# Patient Record
Sex: FEMALE | Race: WHITE | NOT HISPANIC OR LATINO
[De-identification: names, ages, dates, MRNs, and addresses within clinical notes are randomized per-mention and may not be internally consistent; named-entity substitution may affect disease eponyms.]

---

## 2017-01-09 ENCOUNTER — APPOINTMENT (OUTPATIENT)
Dept: NEUROLOGY | Facility: CLINIC | Age: 57
End: 2017-01-09

## 2017-01-17 ENCOUNTER — OUTPATIENT (OUTPATIENT)
Dept: OUTPATIENT SERVICES | Facility: HOSPITAL | Age: 57
LOS: 1 days | End: 2017-01-17
Payer: COMMERCIAL

## 2017-01-17 PROCEDURE — 72148 MRI LUMBAR SPINE W/O DYE: CPT | Mod: 26

## 2017-01-17 PROCEDURE — 70553 MRI BRAIN STEM W/O & W/DYE: CPT | Mod: 26

## 2017-01-17 PROCEDURE — 70553 MRI BRAIN STEM W/O & W/DYE: CPT

## 2017-01-17 PROCEDURE — A9585: CPT

## 2017-01-17 PROCEDURE — 72148 MRI LUMBAR SPINE W/O DYE: CPT

## 2017-01-18 ENCOUNTER — RESULT REVIEW (OUTPATIENT)
Age: 57
End: 2017-01-18

## 2017-02-01 ENCOUNTER — TRANSCRIPTION ENCOUNTER (OUTPATIENT)
Age: 57
End: 2017-02-01

## 2017-02-13 ENCOUNTER — TRANSCRIPTION ENCOUNTER (OUTPATIENT)
Age: 57
End: 2017-02-13

## 2017-02-13 ENCOUNTER — APPOINTMENT (OUTPATIENT)
Dept: NEUROSURGERY | Facility: CLINIC | Age: 57
End: 2017-02-13

## 2017-02-13 VITALS
BODY MASS INDEX: 25.4 KG/M2 | HEIGHT: 62 IN | HEART RATE: 108 BPM | OXYGEN SATURATION: 97 % | WEIGHT: 138 LBS | SYSTOLIC BLOOD PRESSURE: 122 MMHG | DIASTOLIC BLOOD PRESSURE: 83 MMHG

## 2017-02-13 DIAGNOSIS — H91.92 UNSPECIFIED HEARING LOSS, LEFT EAR: ICD-10-CM

## 2017-02-13 RX ORDER — PANTOPRAZOLE SODIUM 40 MG/1
40 GRANULE, DELAYED RELEASE ORAL
Refills: 0 | Status: ACTIVE | COMMUNITY

## 2017-05-16 ENCOUNTER — APPOINTMENT (OUTPATIENT)
Dept: HEART AND VASCULAR | Facility: CLINIC | Age: 57
End: 2017-05-16

## 2017-05-16 ENCOUNTER — LABORATORY RESULT (OUTPATIENT)
Age: 57
End: 2017-05-16

## 2017-05-16 VITALS
SYSTOLIC BLOOD PRESSURE: 110 MMHG | WEIGHT: 132 LBS | DIASTOLIC BLOOD PRESSURE: 80 MMHG | HEART RATE: 65 BPM | HEIGHT: 62 IN | BODY MASS INDEX: 24.29 KG/M2

## 2017-05-16 VITALS — DIASTOLIC BLOOD PRESSURE: 84 MMHG | SYSTOLIC BLOOD PRESSURE: 126 MMHG | HEART RATE: 63 BPM

## 2017-05-16 DIAGNOSIS — I45.10 UNSPECIFIED RIGHT BUNDLE-BRANCH BLOCK: ICD-10-CM

## 2017-05-16 DIAGNOSIS — R00.2 PALPITATIONS: ICD-10-CM

## 2017-05-16 DIAGNOSIS — M54.5 LOW BACK PAIN: ICD-10-CM

## 2017-05-16 DIAGNOSIS — R42 DIZZINESS AND GIDDINESS: ICD-10-CM

## 2017-05-18 LAB
25(OH)D3 SERPL-MCNC: 28.3 NG/ML
ALBUMIN SERPL ELPH-MCNC: 4.2 G/DL
ALP BLD-CCNC: 91 U/L
ALT SERPL-CCNC: 17 U/L
ANION GAP SERPL CALC-SCNC: 14 MMOL/L
AST SERPL-CCNC: 19 U/L
BASOPHILS # BLD AUTO: 0.04 K/UL
BASOPHILS NFR BLD AUTO: 0.7 %
BILIRUB SERPL-MCNC: 0.4 MG/DL
BUN SERPL-MCNC: 15 MG/DL
CALCIUM SERPL-MCNC: 9.5 MG/DL
CHLORIDE SERPL-SCNC: 104 MMOL/L
CHOLEST SERPL-MCNC: 197 MG/DL
CHOLEST/HDLC SERPL: 2.9 RATIO
CO2 SERPL-SCNC: 21 MMOL/L
CREAT SERPL-MCNC: 0.67 MG/DL
EOSINOPHIL # BLD AUTO: 0.09 K/UL
EOSINOPHIL NFR BLD AUTO: 1.5 %
ESTRADIOL SERPL-MCNC: <5 PG/ML
FSH SERPL-MCNC: 75.1 IU/L
GLUCOSE SERPL-MCNC: 91 MG/DL
HBA1C MFR BLD HPLC: 5 %
HCT VFR BLD CALC: 39.2 %
HDLC SERPL-MCNC: 68 MG/DL
HGB BLD-MCNC: 12.8 G/DL
IMM GRANULOCYTES NFR BLD AUTO: 0.2 %
LDLC SERPL CALC-MCNC: 108 MG/DL
LH SERPL-ACNC: 29.7 IU/L
LYMPHOCYTES # BLD AUTO: 1.1 K/UL
LYMPHOCYTES NFR BLD AUTO: 18 %
MAGNESIUM RBC-MCNC: 5.5 MG/DL
MAN DIFF?: NORMAL
MCHC RBC-ENTMCNC: 30 PG
MCHC RBC-ENTMCNC: 32.7 GM/DL
MCV RBC AUTO: 91.8 FL
MONOCYTES # BLD AUTO: 0.42 K/UL
MONOCYTES NFR BLD AUTO: 6.9 %
NEUTROPHILS # BLD AUTO: 4.46 K/UL
NEUTROPHILS NFR BLD AUTO: 72.7 %
PLATELET # BLD AUTO: 186 K/UL
POTASSIUM SERPL-SCNC: 4.1 MMOL/L
PROT SERPL-MCNC: 6.8 G/DL
RBC # BLD: 4.27 M/UL
RBC # FLD: 14.1 %
SODIUM SERPL-SCNC: 139 MMOL/L
T3RU NFR SERPL: 0.9 INDEX
T4 SERPL-MCNC: 5.8 UG/DL
TRIGL SERPL-MCNC: 104 MG/DL
TSH SERPL-ACNC: 0.82 UIU/ML
WBC # FLD AUTO: 6.12 K/UL

## 2017-06-01 ENCOUNTER — APPOINTMENT (OUTPATIENT)
Dept: HEART AND VASCULAR | Facility: CLINIC | Age: 57
End: 2017-06-01

## 2017-06-01 VITALS
HEART RATE: 60 BPM | SYSTOLIC BLOOD PRESSURE: 120 MMHG | BODY MASS INDEX: 24.11 KG/M2 | DIASTOLIC BLOOD PRESSURE: 82 MMHG | WEIGHT: 131 LBS | HEIGHT: 62 IN

## 2017-06-01 DIAGNOSIS — R23.3 SPONTANEOUS ECCHYMOSES: ICD-10-CM

## 2017-06-01 DIAGNOSIS — I83.10 VARICOSE VEINS OF UNSPECIFIED LOWER EXTREMITY WITH INFLAMMATION: ICD-10-CM

## 2017-06-01 DIAGNOSIS — M79.605 PAIN IN RIGHT LEG: ICD-10-CM

## 2017-06-01 DIAGNOSIS — M79.604 PAIN IN RIGHT LEG: ICD-10-CM

## 2017-06-01 DIAGNOSIS — M48.06 SPINAL STENOSIS, LUMBAR REGION: ICD-10-CM

## 2017-06-01 DIAGNOSIS — R60.0 LOCALIZED EDEMA: ICD-10-CM

## 2017-06-01 DIAGNOSIS — I83.893 VARICOSE VEINS OF BILATERAL LOWER EXTREMITIES WITH OTHER COMPLICATIONS: ICD-10-CM

## 2017-06-06 ENCOUNTER — FORM ENCOUNTER (OUTPATIENT)
Age: 57
End: 2017-06-06

## 2017-06-07 ENCOUNTER — OUTPATIENT (OUTPATIENT)
Dept: OUTPATIENT SERVICES | Facility: HOSPITAL | Age: 57
LOS: 1 days | End: 2017-06-07
Payer: COMMERCIAL

## 2017-06-07 ENCOUNTER — APPOINTMENT (OUTPATIENT)
Dept: ORTHOPEDIC SURGERY | Facility: CLINIC | Age: 57
End: 2017-06-07

## 2017-06-07 VITALS
BODY MASS INDEX: 24.29 KG/M2 | DIASTOLIC BLOOD PRESSURE: 80 MMHG | WEIGHT: 132 LBS | SYSTOLIC BLOOD PRESSURE: 122 MMHG | HEIGHT: 62 IN

## 2017-06-07 DIAGNOSIS — M48.06 SPINAL STENOSIS, LUMBAR REGION: ICD-10-CM

## 2017-06-07 DIAGNOSIS — M54.16 SPINAL STENOSIS, LUMBAR REGION: ICD-10-CM

## 2017-06-07 PROCEDURE — 72100 X-RAY EXAM L-S SPINE 2/3 VWS: CPT

## 2017-06-07 PROCEDURE — 72082 X-RAY EXAM ENTIRE SPI 2/3 VW: CPT

## 2017-06-07 PROCEDURE — 72082 X-RAY EXAM ENTIRE SPI 2/3 VW: CPT | Mod: 26

## 2017-08-05 PROBLEM — M48.06 LUMBAR SPINAL STENOSIS: Status: ACTIVE | Noted: 2017-06-07

## 2017-08-05 PROBLEM — M79.604 LEG PAIN, BILATERAL: Status: ACTIVE | Noted: 2017-08-05

## 2017-08-05 PROBLEM — I83.10 STASIS ECZEMA: Status: ACTIVE | Noted: 2017-08-05

## 2017-08-05 PROBLEM — R60.0 BILATERAL LEG EDEMA: Status: ACTIVE | Noted: 2017-08-05

## 2017-08-05 PROBLEM — I83.893 SYMPTOMATIC VARICOSE VEINS, BILATERAL: Status: ACTIVE | Noted: 2017-08-05

## 2017-08-22 ENCOUNTER — APPOINTMENT (OUTPATIENT)
Dept: ORTHOPEDIC SURGERY | Facility: CLINIC | Age: 57
End: 2017-08-22

## 2017-10-17 ENCOUNTER — APPOINTMENT (OUTPATIENT)
Dept: HEART AND VASCULAR | Facility: CLINIC | Age: 57
End: 2017-10-17

## 2017-12-07 ENCOUNTER — APPOINTMENT (OUTPATIENT)
Dept: HEART AND VASCULAR | Facility: CLINIC | Age: 57
End: 2017-12-07

## 2018-11-18 ENCOUNTER — FORM ENCOUNTER (OUTPATIENT)
Age: 58
End: 2018-11-18

## 2018-11-19 ENCOUNTER — APPOINTMENT (OUTPATIENT)
Dept: MRI IMAGING | Facility: HOSPITAL | Age: 58
End: 2018-11-19

## 2018-11-19 ENCOUNTER — OUTPATIENT (OUTPATIENT)
Dept: OUTPATIENT SERVICES | Facility: HOSPITAL | Age: 58
LOS: 1 days | End: 2018-11-19
Payer: COMMERCIAL

## 2018-11-19 PROCEDURE — 70553 MRI BRAIN STEM W/O & W/DYE: CPT | Mod: 26

## 2018-11-19 PROCEDURE — 70553 MRI BRAIN STEM W/O & W/DYE: CPT

## 2018-11-19 PROCEDURE — A9585: CPT

## 2019-09-18 ENCOUNTER — APPOINTMENT (OUTPATIENT)
Dept: INTERNAL MEDICINE | Facility: CLINIC | Age: 59
End: 2019-09-18
Payer: COMMERCIAL

## 2019-09-18 VITALS
BODY MASS INDEX: 28.68 KG/M2 | TEMPERATURE: 98.5 F | HEART RATE: 91 BPM | SYSTOLIC BLOOD PRESSURE: 136 MMHG | DIASTOLIC BLOOD PRESSURE: 86 MMHG | WEIGHT: 146.1 LBS | OXYGEN SATURATION: 99 % | HEIGHT: 60 IN

## 2019-09-18 PROCEDURE — 94010 BREATHING CAPACITY TEST: CPT

## 2019-09-18 PROCEDURE — 99204 OFFICE O/P NEW MOD 45 MIN: CPT | Mod: 25

## 2019-09-18 PROCEDURE — 36415 COLL VENOUS BLD VENIPUNCTURE: CPT

## 2019-09-20 LAB
A1AT SERPL-MCNC: 125 MG/DL
DEPRECATED KAPPA LC FREE/LAMBDA SER: 0.99 RATIO
IGA SER QL IEP: 156 MG/DL
IGG SER QL IEP: 1170 MG/DL
IGM SER QL IEP: 127 MG/DL
KAPPA LC CSF-MCNC: 1.67 MG/DL
KAPPA LC SERPL-MCNC: 1.65 MG/DL
RAPID RVP RESULT: DETECTED
RV+EV RNA SPEC QL NAA+PROBE: DETECTED

## 2019-09-20 NOTE — ASSESSMENT
[FreeTextEntry1] : Maryellen presents with chest tightness, BRIDGES, wheezing, and cough with green sputum.\par Suspect recent acute viral URI with cough - now more of an asthmatic bronchitis.\par Likely developed after recent air travel.\par Doubt underlying pulmonary disease but will exclude.\par \par Will review CXR CD with radiology\par May need CT chest\par Collect sputum for full cultures\par RVP sent\par Quantiferon Plus, alpha -1- antitrypsin level and immunoglobulin panel sent\par FVL done = see scanned report = d/w patient = WNL\par Will need full PFT's\par Advised flu and pneumococcal vaccination\par No longer smokes\par Weight control\par Cardiology f/u\par Rest\par Fluids\par Tylenol for fever, pain\par Mucinex DM bid\par Course of Bactrim DS bid\par Gargles/lozenges\par Steam\par Warm compresses to sinuses\par Saline nasal rinses\par Flonase daily\par Start Breo Ellipta 1 puff daily\par Albuterol if needed\par Add Prednisone 20 mg daily for 5-7 days\par RTC 1 week and as needed\par To call for any pulmonary issues\par To call if worse or if not improving\par \par

## 2019-09-20 NOTE — PHYSICAL EXAM
[General Appearance - Well Developed] : well developed [Well Groomed] : well groomed [General Appearance - Well Nourished] : well nourished [General Appearance - In No Acute Distress] : no acute distress [Jugular Venous Distention Increased] : there was no jugular-venous distention [Heart Rate And Rhythm] : heart rate and rhythm were normal [Edema] : no peripheral edema present [Heart Sounds] : normal S1 and S2 [] : no respiratory distress [Exaggerated Use Of Accessory Muscles For Inspiration] : no accessory muscle use [Respiration, Rhythm And Depth] : normal respiratory rhythm and effort [Abdomen Soft] : soft [Bowel Sounds] : normal bowel sounds [Abnormal Walk] : normal gait [Nail Clubbing] : no clubbing of the fingernails [Cyanosis, Localized] : no localized cyanosis [Skin Color & Pigmentation] : normal skin color and pigmentation [No Focal Deficits] : no focal deficits [Oriented To Time, Place, And Person] : oriented to person, place, and time [Affect] : the affect was normal [FreeTextEntry1] : R=16; scattered b/l expiratory wheezes; good air entry

## 2019-09-20 NOTE — HISTORY OF PRESENT ILLNESS
[Regional Soft Tissue Swelling Both Lower Extremities] : denies lower extremity edema [Fever] : denies fever [Wt Gain ___ Lbs] : recent [unfilled] ~Upound(s) weight gain [Difficulty Breathing During Exertion] : dyspnea on exertion [Feelings Of Weakness On Exertion] : exercise intolerance [Cough] : coughing [Wheezing] : wheezing [Chest Pain Or Discomfort] : chest pain [2  -  Slight] : 2, slight [Class II - Mild Symptoms and Slight Limitations] : II [Does not check] : The patient is not checking peak flow at home [More Frequent Use Needed Recently] : Patient reports recent increase in frequency of [URI] : upper respiratory tract infection [Exercise] : exercise [Side Effects] : the patient complains of medication side effects [Short Acting Beta Agonist] : short acting beta agonist agent [PFTs] : pulmonary function tests [Flu Vaccine] : influenza virus vaccine [Worsened] : have worsened [Wt Loss ___ Lbs] : no recent weight loss [Adherent] : the patient is not adherent with ~his/her~ medication regimen [Oxygen] : the patient uses no supplemental oxygen [Goals--Doing Well] : the patient is not doing well with ~his/her~ goals [de-identified] : see narrative [de-identified] : CXR 2019 [de-identified] : denies hemoptysis [FreeTextEntry1] : Maryellen comes in for an initial pulmonary evaluation because of cough, chest tightness, dyspnea on exertion, and wheezing. She is concerned because she feels that this is her second bout of bronchitis this year and wants to get to the bottom of it.\par She reports a history of walking pneumonia in the past. She denies any history of pleurisy. She has had one to 2 episodes of bronchitis in her lifetime but now reports 2 episodes in 2019. She denies any history of tuberculosis, asthma, COPD, pneumothorax, lung cancer, sleep apnea, or DVT/pulmonary embolism. She denies any history of prior lung surgery. There is a family history of COPD in her father and mother. 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 She denies any radiation or chemical exposure. She denies any asbestos exposure. She is having construction done in her home and reports exposure to dusts. She denies any drug use. She drinks alcohol socially. She reports that she experimented with cigarettes at age 12. She smoked anywhere from 2-20 cigarettes per day for 10-12 years in her 20s. She has 2 dogs in her home. She has had travel to [place], [place], [facility] and [place] over the year. She is allergic to penicillin. Her recent medication list was reviewed. The patient reports an episode of bronchitis treated with doxycycline in April 2019. She reports that she was vaccinated with MMR and TDAP on August 31, 2019. On September 6 she traveled to [place] via plane and returned on September 11. On September 12 she traveled to [place] and back. She had a recent clear chest x-ray. She has not had a chest CT or PFTs. She does not take the influenza vaccine and has not had pneumococcal vaccination. She is under the care of a cardiologist since 2015 because of an irregular heartbeat. She has had EKG, echo and Holter testing. Lately she has had weight gain with elevation in her blood pressure and cholesterol. She presently denies any edema, calf pain, orthopnea, or PND. She has had weight gain. She feels feverish and has sweats. Her MAXIMUM TEMPERATURE has been 99°F. She complains of chest. She has intermittent palpitations. She complains of a cough productive of green sputum without hemoptysis. She notices breathlessness on exertion and wheezing. She is fatigued. She denies any body aches. She denies any abdominal pain or nausea/vomiting/diarrhea.\par She recently has been evaluated in the emergency room and in urgent care. She has been treated with albuterol MDI and doxycycline. Her cardiologist has been concerned that her palpitations might be related to albuterol use. She presently has a Holter monitor in place. Her recent labs and EKG were reviewed and scanned into the system.

## 2019-09-20 NOTE — REVIEW OF SYSTEMS
[Recent Wt Gain (___ Lbs)] : recent [unfilled] ~Ulb weight gain [Fever] : fever [Postnasal Drip] : postnasal drip [Nasal Congestion] : nasal congestion [Sinus Problems] : sinus problems [As Noted in HPI] : as noted in HPI [Cough] : cough [Sputum] : sputum  [Dyspnea] : dyspnea [Chest Tightness] : chest tightness [Wheezing] : wheezing [Frequent URIs] : frequent upper respiratory infections [Hypertension] : ~T hypertension [Palpitations] : palpitations [Menopause] : menopause [Negative] : Sleep Disorder

## 2019-09-21 LAB — BACTERIA SPT CULT: NORMAL

## 2019-09-23 LAB
BACTERIA SPT CULT: NORMAL
M TB IFN-G BLD-IMP: NEGATIVE
QUANTIFERON TB PLUS MITOGEN MINUS NIL: >10 IU/ML
QUANTIFERON TB PLUS NIL: 0.03 IU/ML
QUANTIFERON TB PLUS TB1 MINUS NIL: 0.01 IU/ML
QUANTIFERON TB PLUS TB2 MINUS NIL: 0 IU/ML

## 2019-09-24 ENCOUNTER — APPOINTMENT (OUTPATIENT)
Dept: INTERNAL MEDICINE | Facility: CLINIC | Age: 59
End: 2019-09-24
Payer: COMMERCIAL

## 2019-09-24 VITALS
BODY MASS INDEX: 27.92 KG/M2 | HEIGHT: 60.5 IN | TEMPERATURE: 98.4 F | OXYGEN SATURATION: 98 % | SYSTOLIC BLOOD PRESSURE: 110 MMHG | DIASTOLIC BLOOD PRESSURE: 78 MMHG | HEART RATE: 75 BPM | WEIGHT: 146 LBS

## 2019-09-24 PROCEDURE — 99214 OFFICE O/P EST MOD 30 MIN: CPT | Mod: 25

## 2019-09-24 PROCEDURE — 94729 DIFFUSING CAPACITY: CPT

## 2019-09-24 PROCEDURE — 94060 EVALUATION OF WHEEZING: CPT

## 2019-09-24 PROCEDURE — 94726 PLETHYSMOGRAPHY LUNG VOLUMES: CPT

## 2019-09-26 NOTE — REVIEW OF SYSTEMS
[Recent Wt Gain (___ Lbs)] : recent [unfilled] ~Ulb weight gain [Nasal Congestion] : nasal congestion [Postnasal Drip] : postnasal drip [Sinus Problems] : sinus problems [As Noted in HPI] : as noted in HPI [Sputum] : sputum  [Dyspnea] : dyspnea [Cough] : cough [Chest Tightness] : chest tightness [Frequent URIs] : frequent upper respiratory infections [Hypertension] : ~T hypertension [Wheezing] : wheezing [Menopause] : menopause [Palpitations] : palpitations [Negative] : Sleep Disorder

## 2019-09-26 NOTE — PHYSICAL EXAM
[Well Groomed] : well groomed [General Appearance - Well Developed] : well developed [General Appearance - In No Acute Distress] : no acute distress [General Appearance - Well Nourished] : well nourished [Jugular Venous Distention Increased] : there was no jugular-venous distention [Heart Rate And Rhythm] : heart rate and rhythm were normal [Heart Sounds] : normal S1 and S2 [Edema] : no peripheral edema present [] : no respiratory distress [Respiration, Rhythm And Depth] : normal respiratory rhythm and effort [Exaggerated Use Of Accessory Muscles For Inspiration] : no accessory muscle use [Auscultation Breath Sounds / Voice Sounds] : lungs were clear to auscultation bilaterally [Bowel Sounds] : normal bowel sounds [Abnormal Walk] : normal gait [Abdomen Soft] : soft [Cyanosis, Localized] : no localized cyanosis [Nail Clubbing] : no clubbing of the fingernails [No Focal Deficits] : no focal deficits [Oriented To Time, Place, And Person] : oriented to person, place, and time [Affect] : the affect was normal [Skin Color & Pigmentation] : normal skin color and pigmentation [FreeTextEntry1] : no stridor

## 2019-09-26 NOTE — ASSESSMENT
[FreeTextEntry1] : Asthmatic bronchitis  RVP + for entero/rhinovirus\par Slowly clinically improving\par \par Will review CXR CD with radiology\par To do CT chest\par Sputum C&S results reviewed with patient\par Recent labs reviewed with patient and WNL\par See scanned PFT report from today = d/w patient\par Flu vaccine declined\par Weight control\par Cardiology f/u\par Mucinex DM bid\par Complete Bactrim\par Gargles/lozenges\par Steam\par Saline nasal rinses\par Flonase daily\par Breo 1 puff daily\par Complete Prednisone\par RTC 1 month and as needed\par To call for any pulmonary issues or if worse/not improving

## 2019-09-26 NOTE — HISTORY OF PRESENT ILLNESS
[Improved] : have improved [Difficulty Breathing During Exertion] : improved dyspnea on exertion [Feelings Of Weakness On Exertion] : improved exercise intolerance [Cough] : improved coughing [Wheezing] : improved wheezing [Regional Soft Tissue Swelling Both Lower Extremities] : denies lower extremity edema [Chest Pain Or Discomfort] : denies chest pain [Fever] : denies fever [Wt Gain ___ Lbs] : recent [unfilled] ~Upound(s) weight gain [2  -  Slight] : 2, slight [Class II - Mild Symptoms and Slight Limitations] : II [Date: ___] : was performed [unfilled] [Does not check] : The patient is not checking peak flow at home [More Frequent Use Needed Recently] : Patient reports recent increase in frequency of [URI] : upper respiratory tract infection [Exercise] : exercise [Adherent] : the patient is adherent with ~his/her~ medication regimen [Short Acting Beta Agonist] : short acting beta agonist agent [Long Acting Beta Agonist] : long acting beta agonist agent [Oral Steroids] : oral corticosteroids [Inhaled Steroids] : inhaled steroids [Goals--Doing Well] : the patient is doing well with ~his/her~ goals [Flu Vaccine] : influenza virus vaccine [Wt Loss ___ Lbs] : no recent weight loss [Oxygen] : the patient uses no supplemental oxygen [Side Effects] : ~He/She~ denies medication side effects [de-identified] : see narrative [de-identified] : denies hemoptysis [de-identified] : CXR 2019 [FreeTextEntry1] : Maryellen comes in for a f/u pulmonary evaluation because of cough, chest tightness, dyspnea on exertion, and wheezing. She is concerned because she feels that this is her second bout of bronchitis this year and wants to get to the bottom of it.\par She reports a history of walking pneumonia in the past. She denies any history of pleurisy. She has had one to 2 episodes of bronchitis in her lifetime but now reports 2 episodes in 2019. She denies any history of tuberculosis, asthma, COPD, pneumothorax, lung cancer, sleep apnea, or DVT/pulmonary embolism. She denies any history of prior lung surgery. There is a family history of COPD in her father and mother. 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 She denies any radiation or chemical exposure. She denies any asbestos exposure. She is having construction done in her home and reports exposure to dusts. She denies any drug use. She drinks alcohol socially. She reports that she experimented with cigarettes at age 12. She smoked anywhere from 2-20 cigarettes per day for 10-12 years in her 20s. She has 2 dogs in her home. She has had travel to [place], [place], [facility] and [place] over the year. She is allergic to penicillin. Her recent medication list was reviewed. The patient reports an episode of bronchitis treated with doxycycline in April 2019. She reports that she was vaccinated with MMR and TDAP on August 31, 2019. On September 6 she traveled to [place] via plane and returned on September 11. On September 12 she traveled to [place] and back. She had a recent clear chest x-ray. She has not had a chest CT. She does not take the influenza vaccine and has not had pneumococcal vaccination. She is under the care of a cardiologist since 2015 because of an irregular heartbeat. She has had EKG, echo and Holter testing. Lately she has had weight gain with elevation in her blood pressure and cholesterol. She presently denies any edema, calf pain, orthopnea, or PND. She has had weight gain. She denies fever. She complains of  less chest tightness. She has intermittent palpitations. She complains of a cough productive of green sputum without hemoptysis. She notices breathlessness on exertion and wheezing. She is fatigued. She denies any body aches. She denies any abdominal pain or nausea/vomiting/diarrhea.\par

## 2019-10-23 ENCOUNTER — TRANSCRIPTION ENCOUNTER (OUTPATIENT)
Age: 59
End: 2019-10-23

## 2019-11-01 LAB
FUNGUS SPT CULT: ABNORMAL
FUNGUS SPT CULT: NORMAL

## 2019-11-06 ENCOUNTER — APPOINTMENT (OUTPATIENT)
Dept: INTERNAL MEDICINE | Facility: CLINIC | Age: 59
End: 2019-11-06

## 2019-11-12 LAB — ACID FAST STN SPT: NORMAL

## 2019-11-13 ENCOUNTER — APPOINTMENT (OUTPATIENT)
Dept: INTERNAL MEDICINE | Facility: CLINIC | Age: 59
End: 2019-11-13
Payer: COMMERCIAL

## 2019-11-13 VITALS
SYSTOLIC BLOOD PRESSURE: 132 MMHG | HEIGHT: 60.25 IN | BODY MASS INDEX: 28.48 KG/M2 | TEMPERATURE: 98.5 F | OXYGEN SATURATION: 98 % | HEART RATE: 77 BPM | DIASTOLIC BLOOD PRESSURE: 92 MMHG | WEIGHT: 147 LBS

## 2019-11-13 PROCEDURE — 99214 OFFICE O/P EST MOD 30 MIN: CPT

## 2019-11-13 NOTE — REVIEW OF SYSTEMS
[Recent Wt Gain (___ Lbs)] : recent [unfilled] ~Ulb weight gain [As Noted in HPI] : as noted in HPI [Dyspnea] : dyspnea [Frequent URIs] : frequent upper respiratory infections [Palpitations] : palpitations [Hypertension] : ~T hypertension [Menopause] : menopause [Negative] : Sleep Disorder

## 2019-11-14 RX ORDER — FLUTICASONE FUROATE AND VILANTEROL TRIFENATATE 200; 25 UG/1; UG/1
200-25 POWDER RESPIRATORY (INHALATION)
Qty: 1 | Refills: 1 | Status: DISCONTINUED | COMMUNITY
Start: 2019-09-18 | End: 2019-11-14

## 2019-11-14 RX ORDER — SULFAMETHOXAZOLE AND TRIMETHOPRIM 800; 160 MG/1; MG/1
800-160 TABLET ORAL TWICE DAILY
Qty: 14 | Refills: 0 | Status: DISCONTINUED | COMMUNITY
Start: 2019-09-18 | End: 2019-11-14

## 2019-11-14 RX ORDER — PREDNISONE 20 MG/1
20 TABLET ORAL
Qty: 5 | Refills: 0 | Status: DISCONTINUED | COMMUNITY
Start: 2019-09-18 | End: 2019-11-14

## 2019-11-14 NOTE — ASSESSMENT
[FreeTextEntry1] : Asthmatic bronchitis  RVP + for entero/rhinovirus\par Clinically improved\par \par Will review CXR CD with radiology\par To do CT chest = she declines\par Sputum C&S results reviewed with patient\par Recent labs reviewed with patient and WNL\par Had PFT's\par Flu vaccine declined\par Weight control\par Cardiology f/u = CPET planned\par RTC as needed\par To call for any pulmonary issues or if worse

## 2019-11-14 NOTE — HISTORY OF PRESENT ILLNESS
[Improved] : have improved [Regional Soft Tissue Swelling Both Lower Extremities] : denies lower extremity edema [Wt Gain ___ Lbs] : recent [unfilled] ~Upound(s) weight gain [Fever] : denies fever [Chest Pain Or Discomfort] : denies chest pain [2  -  Slight] : 2, slight [Date: ___] : was performed [unfilled] [Class II - Mild Symptoms and Slight Limitations] : II [Does not check] : The patient is not checking peak flow at home [Adherent] : the patient is adherent with ~his/her~ medication regimen [Exercise] : exercise [URI] : upper respiratory tract infection [Short Acting Beta Agonist] : short acting beta agonist agent [Goals--Doing Well] : the patient is doing well with ~his/her~ goals [Flu Vaccine] : influenza virus vaccine [Difficulty Breathing During Exertion] : stable dyspnea on exertion [Cough] : resolved coughing [Feelings Of Weakness On Exertion] : stable exercise intolerance [Wheezing] : resolved wheezing [Wt Loss ___ Lbs] : no recent weight loss [Oxygen] : the patient uses no supplemental oxygen [More Frequent Use Needed Recently] : Patient reports no recent increase in frequency of [Side Effects] : ~He/She~ denies medication side effects [de-identified] : see narrative [de-identified] : denies hemoptysis [de-identified] : CXR 2019 [FreeTextEntry1] : Maryellen comes in for a f/u pulmonary evaluation because of cough, chest tightness, dyspnea on exertion, and wheezing. She is concerned because she feels that this is her second bout of bronchitis this year and wants to get to the bottom of it.\par She reports a history of walking pneumonia in the past. She denies any history of pleurisy. She has had one to 2 episodes of bronchitis in her lifetime but now reports 2 episodes in 2019. She denies any history of tuberculosis, asthma, COPD, pneumothorax, lung cancer, sleep apnea, or DVT/pulmonary embolism. She denies any history of prior lung surgery. There is a family history of COPD in her father and mother. 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 She denies any radiation or chemical exposure. She denies any asbestos exposure. She is having construction done in her home and reports exposure to dusts. She denies any drug use. She drinks alcohol socially. She reports that she experimented with cigarettes at age 12. She smoked anywhere from 2-20 cigarettes per day for 10-12 years in her 20s. She has 2 dogs in her home. She has had travel to [place], [place], [facility] and [place] over the year. She is allergic to penicillin. Her recent medication list was reviewed. The patient reports an episode of bronchitis treated with doxycycline in April 2019. She reports that she was vaccinated with MMR and TDAP on August 31, 2019. On September 6 she traveled to [place] via plane and returned on September 11. On September 12 she traveled to [place] and back. She had a recent clear chest x-ray. She has not had a chest CT. She does not take the influenza vaccine and has not had pneumococcal vaccination. She is under the care of a cardiologist since 2015 because of an irregular heartbeat. She has had EKG, echo and Holter testing. Lately she has had weight gain with elevation in her blood pressure and cholesterol. She presently denies any edema, calf pain, orthopnea, or PND. She has had weight gain. She denies fever. She denies chest pain. She has intermittent palpitations. Her cough has resolved. She denies hemoptysis. She denies any wheezing. Her breathing is fine now. She denies any body aches. She denies any abdominal pain or nausea/vomiting/diarrhea. She had a gastroenteritis for 2 weeks while in [place] recently.\par

## 2019-11-14 NOTE — PHYSICAL EXAM
[General Appearance - Well Developed] : well developed [Well Groomed] : well groomed [General Appearance - Well Nourished] : well nourished [General Appearance - In No Acute Distress] : no acute distress [Jugular Venous Distention Increased] : there was no jugular-venous distention [Edema] : no peripheral edema present [Heart Rate And Rhythm] : heart rate and rhythm were normal [Heart Sounds] : normal S1 and S2 [Respiration, Rhythm And Depth] : normal respiratory rhythm and effort [] : no respiratory distress [Auscultation Breath Sounds / Voice Sounds] : lungs were clear to auscultation bilaterally [Exaggerated Use Of Accessory Muscles For Inspiration] : no accessory muscle use [Bowel Sounds] : normal bowel sounds [Abdomen Soft] : soft [Abnormal Walk] : normal gait [No Focal Deficits] : no focal deficits [Cyanosis, Localized] : no localized cyanosis [Nail Clubbing] : no clubbing of the fingernails [Affect] : the affect was normal [Oriented To Time, Place, And Person] : oriented to person, place, and time [Skin Color & Pigmentation] : normal skin color and pigmentation [FreeTextEntry1] : R=16; no wheezes; good air entry

## 2019-12-02 ENCOUNTER — APPOINTMENT (OUTPATIENT)
Dept: HEART AND VASCULAR | Facility: CLINIC | Age: 59
End: 2019-12-02
Payer: COMMERCIAL

## 2019-12-02 PROCEDURE — 94010 BREATHING CAPACITY TEST: CPT | Mod: 59

## 2019-12-02 PROCEDURE — 94727 GAS DIL/WSHOT DETER LNG VOL: CPT

## 2019-12-02 PROCEDURE — 94729 DIFFUSING CAPACITY: CPT

## 2019-12-02 PROCEDURE — 94621 CARDIOPULM EXERCISE TESTING: CPT

## 2020-11-13 ENCOUNTER — APPOINTMENT (OUTPATIENT)
Dept: OPHTHALMOLOGY | Facility: CLINIC | Age: 60
End: 2020-11-13
Payer: COMMERCIAL

## 2020-11-13 ENCOUNTER — NON-APPOINTMENT (OUTPATIENT)
Age: 60
End: 2020-11-13

## 2020-11-13 PROCEDURE — 92002 INTRM OPH EXAM NEW PATIENT: CPT

## 2020-11-13 PROCEDURE — 99072 ADDL SUPL MATRL&STAF TM PHE: CPT

## 2020-11-25 ENCOUNTER — NON-APPOINTMENT (OUTPATIENT)
Age: 60
End: 2020-11-25

## 2020-11-25 ENCOUNTER — APPOINTMENT (OUTPATIENT)
Dept: OPHTHALMOLOGY | Facility: CLINIC | Age: 60
End: 2020-11-25
Payer: COMMERCIAL

## 2020-11-25 PROCEDURE — 99072 ADDL SUPL MATRL&STAF TM PHE: CPT

## 2020-11-25 PROCEDURE — 92012 INTRM OPH EXAM EST PATIENT: CPT

## 2022-10-13 ENCOUNTER — APPOINTMENT (OUTPATIENT)
Dept: INTERNAL MEDICINE | Facility: CLINIC | Age: 62
End: 2022-10-13

## 2022-10-13 DIAGNOSIS — R05.3 CHRONIC COUGH: ICD-10-CM

## 2022-10-13 DIAGNOSIS — J45.909 UNSPECIFIED ASTHMA, UNCOMPLICATED: ICD-10-CM

## 2022-10-13 DIAGNOSIS — Z87.898 PERSONAL HISTORY OF OTHER SPECIFIED CONDITIONS: ICD-10-CM

## 2022-10-13 DIAGNOSIS — Z23 ENCOUNTER FOR IMMUNIZATION: ICD-10-CM

## 2022-10-13 DIAGNOSIS — R05.8 OTHER SPECIFIED COUGH: ICD-10-CM

## 2022-10-13 DIAGNOSIS — Z86.018 PERSONAL HISTORY OF OTHER BENIGN NEOPLASM: ICD-10-CM

## 2022-10-13 DIAGNOSIS — U07.1 COVID-19: ICD-10-CM

## 2022-10-13 PROCEDURE — 99213 OFFICE O/P EST LOW 20 MIN: CPT | Mod: 95

## 2022-10-13 RX ORDER — FLUTICASONE PROPIONATE 50 UG/1
50 SPRAY, METERED NASAL TWICE DAILY
Qty: 1 | Refills: 0 | Status: ACTIVE | COMMUNITY
Start: 2022-10-13 | End: 1900-01-01

## 2022-10-13 RX ORDER — DOXYCYCLINE 100 MG/1
100 CAPSULE ORAL TWICE DAILY
Qty: 20 | Refills: 0 | Status: ACTIVE | COMMUNITY
Start: 2022-10-13 | End: 1900-01-01

## 2022-10-13 NOTE — REVIEW OF SYSTEMS
[Fatigue] : fatigue [Vision Problems] : vision problems [Nasal Discharge] : nasal discharge [Postnasal Drip] : postnasal drip [Cough] : cough [Negative] : Heme/Lymph

## 2022-10-13 NOTE — ASSESSMENT
[FreeTextEntry1] : Recent acute COVID-19 infection\par Now with persistent productive cough\par ?  Bronchitis superimposed\par ?  Pneumonia superimposed\par \par Monitor oxygen saturations\par To do chest x-ray = prescription emailed to patient\par To collect sputum for C&S = prescription emailed to patient\par She does not smoke\par Vaccines when stable\par Rest\par Hydration\par Tylenol to control fever or pain as needed\par Mucinex DM twice daily to control chest congestion and cough\par Start doxycycline 100 mg twice daily for 7 to 10 days\par Gargles/lozenges/warm fluids for any sore throat symptoms\par Apply warm compresses to sinuses and use steam therapy\par Can use saline nasal rinses\par Can use Flonase twice daily for nasal symptoms\par She will call me in follow-up on October 17 and as needed\par She will call me sooner if her status worsens or does not improve or for any pulmonary issues\par All of the above was discussed in detail with her\par All of her questions were answered\par Written directions were emailed to her at her request\par She verbally confirmed understanding of all of the above and agreement with the above plan\par Questions regarding her mother's prescriptions were also addressed

## 2022-10-13 NOTE — HISTORY OF PRESENT ILLNESS
[Home] : at home, [unfilled] , at the time of the visit. [Medical Office: ([facility])___] : at the medical office located in  [Verbal consent obtained from patient] : the patient, [unfilled] [FreeTextEntry8] : The patient reports that she recently tested positive for COVID.\par She denies any edema, orthopnea, or PND.  She denies any calf pain.  She denies any myalgias.  She remains fatigued.  She denies any abdominal pain or nausea/vomiting/diarrhea.  She denies any recent fevers.  She denies any hemoptysis.  She denies any chest pain.  She denies any shortness of breath or wheezing.  She complains of a worsening cough.  She has chest congestion.  She has sputum production.  She is concerned that she is developing a superimposed bronchitis or pneumonia.  She continues to have nasal congestion with postnasal drip.

## 2022-10-13 NOTE — PHYSICAL EXAM
[No Acute Distress] : no acute distress [Well Nourished] : well nourished [Well Developed] : well developed [Well-Appearing] : well-appearing [No Respiratory Distress] : no respiratory distress  [No Accessory Muscle Use] : no accessory muscle use [Speech Grossly Normal] : speech grossly normal [Normal Affect] : the affect was normal [Alert and Oriented x3] : oriented to person, place, and time [de-identified] : Wearing glasses [de-identified] : R = 16; no audible stridor or wheezing noted

## 2024-11-18 ENCOUNTER — APPOINTMENT (OUTPATIENT)
Dept: INTERNAL MEDICINE | Facility: CLINIC | Age: 64
End: 2024-11-18
Payer: COMMERCIAL

## 2024-11-18 VITALS
WEIGHT: 148 LBS | DIASTOLIC BLOOD PRESSURE: 82 MMHG | BODY MASS INDEX: 28.68 KG/M2 | HEART RATE: 77 BPM | SYSTOLIC BLOOD PRESSURE: 136 MMHG | HEIGHT: 60.25 IN | TEMPERATURE: 98.6 F | OXYGEN SATURATION: 98 %

## 2024-11-18 DIAGNOSIS — J34.9 UNSPECIFIED DISORDER OF NOSE AND NASAL SINUSES: ICD-10-CM

## 2024-11-18 DIAGNOSIS — G47.33 OBSTRUCTIVE SLEEP APNEA (ADULT) (PEDIATRIC): ICD-10-CM

## 2024-11-18 DIAGNOSIS — R06.83 SNORING: ICD-10-CM

## 2024-11-18 DIAGNOSIS — R06.09 OTHER FORMS OF DYSPNEA: ICD-10-CM

## 2024-11-18 DIAGNOSIS — Z81.8 FAMILY HISTORY OF OTHER MENTAL AND BEHAVIORAL DISORDERS: ICD-10-CM

## 2024-11-18 DIAGNOSIS — Z78.9 OTHER SPECIFIED HEALTH STATUS: ICD-10-CM

## 2024-11-18 DIAGNOSIS — M51.369: ICD-10-CM

## 2024-11-18 DIAGNOSIS — Z80.1 FAMILY HISTORY OF MALIGNANT NEOPLASM OF TRACHEA, BRONCHUS AND LUNG: ICD-10-CM

## 2024-11-18 DIAGNOSIS — R05.8 OTHER SPECIFIED COUGH: ICD-10-CM

## 2024-11-18 DIAGNOSIS — J34.2 DEVIATED NASAL SEPTUM: ICD-10-CM

## 2024-11-18 DIAGNOSIS — Z63.4 DISAPPEARANCE AND DEATH OF FAMILY MEMBER: ICD-10-CM

## 2024-11-18 DIAGNOSIS — Z82.5 FAMILY HISTORY OF ASTHMA AND OTHER CHRONIC LOWER RESPIRATORY DISEASES: ICD-10-CM

## 2024-11-18 DIAGNOSIS — Z87.891 PERSONAL HISTORY OF NICOTINE DEPENDENCE: ICD-10-CM

## 2024-11-18 PROCEDURE — G2211 COMPLEX E/M VISIT ADD ON: CPT | Mod: NC

## 2024-11-18 PROCEDURE — 94726 PLETHYSMOGRAPHY LUNG VOLUMES: CPT

## 2024-11-18 PROCEDURE — 94729 DIFFUSING CAPACITY: CPT

## 2024-11-18 PROCEDURE — 94010 BREATHING CAPACITY TEST: CPT

## 2024-11-18 PROCEDURE — 99215 OFFICE O/P EST HI 40 MIN: CPT | Mod: 25

## 2024-11-18 RX ORDER — SENNOSIDES 8.6 MG
TABLET ORAL
Refills: 0 | Status: ACTIVE | COMMUNITY

## 2024-11-18 RX ORDER — ARGININE 500 MG
TABLET ORAL
Refills: 0 | Status: ACTIVE | COMMUNITY

## 2024-11-18 RX ORDER — ROSUVASTATIN CALCIUM 5 MG/1
5 TABLET, FILM COATED ORAL
Refills: 0 | Status: ACTIVE | COMMUNITY

## 2024-11-18 RX ORDER — LOSARTAN POTASSIUM 50 MG/1
50 TABLET, FILM COATED ORAL
Refills: 0 | Status: ACTIVE | COMMUNITY

## 2024-11-18 SDOH — SOCIAL STABILITY - SOCIAL INSECURITY: DISSAPEARANCE AND DEATH OF FAMILY MEMBER: Z63.4

## 2024-11-22 ENCOUNTER — TRANSCRIPTION ENCOUNTER (OUTPATIENT)
Age: 64
End: 2024-11-22